# Patient Record
Sex: MALE | Race: WHITE
[De-identification: names, ages, dates, MRNs, and addresses within clinical notes are randomized per-mention and may not be internally consistent; named-entity substitution may affect disease eponyms.]

---

## 2018-07-21 ENCOUNTER — HOSPITAL ENCOUNTER (EMERGENCY)
Dept: HOSPITAL 92 - ERS | Age: 33
Discharge: HOME | End: 2018-07-21
Payer: COMMERCIAL

## 2018-07-21 DIAGNOSIS — K29.70: Primary | ICD-10-CM

## 2018-07-21 DIAGNOSIS — F17.210: ICD-10-CM

## 2018-07-21 LAB
ALBUMIN SERPL BCG-MCNC: 4.7 G/DL (ref 3.5–5)
ALP SERPL-CCNC: 65 U/L (ref 40–150)
ALT SERPL W P-5'-P-CCNC: 13 U/L (ref 8–55)
ANION GAP SERPL CALC-SCNC: 11 MMOL/L (ref 10–20)
AST SERPL-CCNC: 15 U/L (ref 5–34)
BASOPHILS # BLD AUTO: 0.1 THOU/UL (ref 0–0.2)
BASOPHILS NFR BLD AUTO: 1.1 % (ref 0–1)
BILIRUB SERPL-MCNC: 0.9 MG/DL (ref 0.2–1.2)
BUN SERPL-MCNC: 9 MG/DL (ref 8.9–20.6)
CALCIUM SERPL-MCNC: 9.6 MG/DL (ref 7.8–10.44)
CHLORIDE SERPL-SCNC: 104 MMOL/L (ref 98–107)
CK SERPL-CCNC: 104 U/L (ref 30–200)
CO2 SERPL-SCNC: 27 MMOL/L (ref 22–29)
CREAT CL PREDICTED SERPL C-G-VRATE: 0 ML/MIN (ref 70–130)
EOSINOPHIL # BLD AUTO: 0.1 THOU/UL (ref 0–0.7)
EOSINOPHIL NFR BLD AUTO: 1.7 % (ref 0–10)
GLOBULIN SER CALC-MCNC: 3 G/DL (ref 2.4–3.5)
GLUCOSE SERPL-MCNC: 134 MG/DL (ref 70–105)
GLUCOSE UR STRIP-MCNC: >=1000 MG/DL
HGB BLD-MCNC: 16.6 G/DL (ref 14–18)
LYMPHOCYTES # BLD: 2.8 THOU/UL (ref 1.2–3.4)
LYMPHOCYTES NFR BLD AUTO: 32.3 % (ref 21–51)
MCH RBC QN AUTO: 32.8 PG (ref 27–31)
MCV RBC AUTO: 96.7 FL (ref 78–98)
MONOCYTES # BLD AUTO: 0.7 THOU/UL (ref 0.11–0.59)
MONOCYTES NFR BLD AUTO: 7.8 % (ref 0–10)
NEUTROPHILS # BLD AUTO: 4.9 THOU/UL (ref 1.4–6.5)
NEUTROPHILS NFR BLD AUTO: 57.2 % (ref 42–75)
PLATELET # BLD AUTO: 318 THOU/UL (ref 130–400)
POTASSIUM SERPL-SCNC: 4 MMOL/L (ref 3.5–5.1)
RBC # BLD AUTO: 5.07 MILL/UL (ref 4.7–6.1)
SODIUM SERPL-SCNC: 138 MMOL/L (ref 136–145)
SP GR UR STRIP: 1.02 (ref 1–1.04)
TROPONIN I SERPL DL<=0.01 NG/ML-MCNC: (no result) NG/ML (ref ?–0.03)
WBC # BLD AUTO: 8.5 THOU/UL (ref 4.8–10.8)

## 2018-07-21 PROCEDURE — 93005 ELECTROCARDIOGRAM TRACING: CPT

## 2018-07-21 PROCEDURE — 80307 DRUG TEST PRSMV CHEM ANLYZR: CPT

## 2018-07-21 PROCEDURE — 82550 ASSAY OF CK (CPK): CPT

## 2018-07-21 PROCEDURE — 84484 ASSAY OF TROPONIN QUANT: CPT

## 2018-07-21 PROCEDURE — 83690 ASSAY OF LIPASE: CPT

## 2018-07-21 PROCEDURE — 85025 COMPLETE CBC W/AUTO DIFF WBC: CPT

## 2018-07-21 PROCEDURE — 81003 URINALYSIS AUTO W/O SCOPE: CPT

## 2018-07-21 PROCEDURE — 80053 COMPREHEN METABOLIC PANEL: CPT

## 2019-10-17 ENCOUNTER — HOSPITAL ENCOUNTER (EMERGENCY)
Dept: HOSPITAL 92 - ERS | Age: 34
Discharge: HOME | End: 2019-10-17
Payer: COMMERCIAL

## 2019-10-17 DIAGNOSIS — F17.210: ICD-10-CM

## 2019-10-17 DIAGNOSIS — M94.0: Primary | ICD-10-CM

## 2019-10-17 PROCEDURE — 93005 ELECTROCARDIOGRAM TRACING: CPT

## 2019-10-17 PROCEDURE — 71046 X-RAY EXAM CHEST 2 VIEWS: CPT

## 2019-10-17 NOTE — RAD
Exam: Chest 2 views



HISTORY:Emergency exam



Comparison: 1/10/2010



FINDINGS:



Lungs: No masses or consolidation.



Cardiac silhouette: Normal size

Pulmonary vessels: Normal

Pleural Spaces: Clear

Pneumothorax: None



Osseous abnormalities: None of acuity.



IMPRESSION: No focal consolidation.





Reported By: Tobi Garcia 

Electronically Signed:  10/17/2019 7:08 PM

## 2020-01-27 ENCOUNTER — HOSPITAL ENCOUNTER (INPATIENT)
Dept: HOSPITAL 92 - ERS | Age: 35
LOS: 6 days | Discharge: HOME | DRG: 234 | End: 2020-02-02
Attending: FAMILY MEDICINE | Admitting: FAMILY MEDICINE
Payer: COMMERCIAL

## 2020-01-27 VITALS — BODY MASS INDEX: 19.6 KG/M2

## 2020-01-27 DIAGNOSIS — E11.65: ICD-10-CM

## 2020-01-27 DIAGNOSIS — E87.1: ICD-10-CM

## 2020-01-27 DIAGNOSIS — I21.4: Primary | ICD-10-CM

## 2020-01-27 DIAGNOSIS — F12.10: ICD-10-CM

## 2020-01-27 DIAGNOSIS — F17.200: ICD-10-CM

## 2020-01-27 DIAGNOSIS — I25.110: ICD-10-CM

## 2020-01-27 DIAGNOSIS — Z82.49: ICD-10-CM

## 2020-01-27 DIAGNOSIS — Z71.6: ICD-10-CM

## 2020-01-27 LAB
ALBUMIN SERPL BCG-MCNC: 4.2 G/DL (ref 3.5–5)
ALP SERPL-CCNC: 80 U/L (ref 40–110)
ALT SERPL W P-5'-P-CCNC: 12 U/L (ref 8–55)
ANION GAP SERPL CALC-SCNC: 13 MMOL/L (ref 10–20)
AST SERPL-CCNC: 14 U/L (ref 5–34)
BASOPHILS # BLD AUTO: 0.1 THOU/UL (ref 0–0.2)
BASOPHILS NFR BLD AUTO: 0.7 % (ref 0–1)
BILIRUB SERPL-MCNC: 1.2 MG/DL (ref 0.2–1.2)
BUN SERPL-MCNC: 9 MG/DL (ref 8.9–20.6)
CALCIUM SERPL-MCNC: 8.8 MG/DL (ref 7.8–10.44)
CHLORIDE SERPL-SCNC: 103 MMOL/L (ref 98–107)
CK MB SERPL-MCNC: 1 NG/ML (ref 0–6.6)
CK SERPL-CCNC: 88 U/L (ref 30–200)
CO2 SERPL-SCNC: 26 MMOL/L (ref 22–29)
CREAT CL PREDICTED SERPL C-G-VRATE: 0 ML/MIN (ref 70–130)
DRUG SCREEN CUTOFF: (no result)
EOSINOPHIL # BLD AUTO: 0.1 THOU/UL (ref 0–0.7)
EOSINOPHIL NFR BLD AUTO: 0.5 % (ref 0–10)
GLOBULIN SER CALC-MCNC: 2.5 G/DL (ref 2.4–3.5)
GLUCOSE SERPL-MCNC: 239 MG/DL (ref 70–105)
GLUCOSE UR STRIP-MCNC: >=1000 MG/DL
HGB BLD-MCNC: 16 G/DL (ref 14–18)
LYMPHOCYTES # BLD: 1.8 THOU/UL (ref 1.2–3.4)
LYMPHOCYTES NFR BLD AUTO: 14.4 % (ref 21–51)
MCH RBC QN AUTO: 32 PG (ref 27–31)
MCV RBC AUTO: 95.5 FL (ref 78–98)
MEDTOX CONTROL LINE VALID?: (no result)
MEDTOX READER #: (no result)
MONOCYTES # BLD AUTO: 0.6 THOU/UL (ref 0.11–0.59)
MONOCYTES NFR BLD AUTO: 4.9 % (ref 0–10)
NEUTROPHILS # BLD AUTO: 10 THOU/UL (ref 1.4–6.5)
NEUTROPHILS NFR BLD AUTO: 79.6 % (ref 42–75)
PLATELET # BLD AUTO: 326 THOU/UL (ref 130–400)
POTASSIUM SERPL-SCNC: 4.1 MMOL/L (ref 3.5–5.1)
RBC # BLD AUTO: 5.01 MILL/UL (ref 4.7–6.1)
SODIUM SERPL-SCNC: 138 MMOL/L (ref 136–145)
TROPONIN I SERPL DL<=0.01 NG/ML-MCNC: 0.1 NG/ML (ref ?–0.03)
WBC # BLD AUTO: 12.6 THOU/UL (ref 4.8–10.8)

## 2020-01-27 PROCEDURE — 85025 COMPLETE CBC W/AUTO DIFF WBC: CPT

## 2020-01-27 PROCEDURE — 96372 THER/PROPH/DIAG INJ SC/IM: CPT

## 2020-01-27 PROCEDURE — 36416 COLLJ CAPILLARY BLOOD SPEC: CPT

## 2020-01-27 PROCEDURE — 90732 PPSV23 VACC 2 YRS+ SUBQ/IM: CPT

## 2020-01-27 PROCEDURE — 71045 X-RAY EXAM CHEST 1 VIEW: CPT

## 2020-01-27 PROCEDURE — S0028 INJECTION, FAMOTIDINE, 20 MG: HCPCS

## 2020-01-27 PROCEDURE — 84443 ASSAY THYROID STIM HORMONE: CPT

## 2020-01-27 PROCEDURE — 36430 TRANSFUSION BLD/BLD COMPNT: CPT

## 2020-01-27 PROCEDURE — 80053 COMPREHEN METABOLIC PANEL: CPT

## 2020-01-27 PROCEDURE — 83036 HEMOGLOBIN GLYCOSYLATED A1C: CPT

## 2020-01-27 PROCEDURE — 90471 IMMUNIZATION ADMIN: CPT

## 2020-01-27 PROCEDURE — 93458 L HRT ARTERY/VENTRICLE ANGIO: CPT

## 2020-01-27 PROCEDURE — 84484 ASSAY OF TROPONIN QUANT: CPT

## 2020-01-27 PROCEDURE — 82553 CREATINE MB FRACTION: CPT

## 2020-01-27 PROCEDURE — S0017 INJECTION, AMINOCAPROIC ACID: HCPCS

## 2020-01-27 PROCEDURE — P9045 ALBUMIN (HUMAN), 5%, 250 ML: HCPCS

## 2020-01-27 PROCEDURE — 82550 ASSAY OF CK (CPK): CPT

## 2020-01-27 PROCEDURE — 93306 TTE W/DOPPLER COMPLETE: CPT

## 2020-01-27 PROCEDURE — 81003 URINALYSIS AUTO W/O SCOPE: CPT

## 2020-01-27 PROCEDURE — 85610 PROTHROMBIN TIME: CPT

## 2020-01-27 PROCEDURE — 93798 PHYS/QHP OP CAR RHAB W/ECG: CPT

## 2020-01-27 PROCEDURE — 84132 ASSAY OF SERUM POTASSIUM: CPT

## 2020-01-27 PROCEDURE — 90686 IIV4 VACC NO PRSV 0.5 ML IM: CPT

## 2020-01-27 PROCEDURE — 86900 BLOOD TYPING SEROLOGIC ABO: CPT

## 2020-01-27 PROCEDURE — 93005 ELECTROCARDIOGRAM TRACING: CPT

## 2020-01-27 PROCEDURE — C1769 GUIDE WIRE: HCPCS

## 2020-01-27 PROCEDURE — 83880 ASSAY OF NATRIURETIC PEPTIDE: CPT

## 2020-01-27 PROCEDURE — 94760 N-INVAS EAR/PLS OXIMETRY 1: CPT

## 2020-01-27 PROCEDURE — G0008 ADMIN INFLUENZA VIRUS VAC: HCPCS

## 2020-01-27 PROCEDURE — 96360 HYDRATION IV INFUSION INIT: CPT

## 2020-01-27 PROCEDURE — 83735 ASSAY OF MAGNESIUM: CPT

## 2020-01-27 PROCEDURE — 93010 ELECTROCARDIOGRAM REPORT: CPT

## 2020-01-27 PROCEDURE — G0009 ADMIN PNEUMOCOCCAL VACCINE: HCPCS

## 2020-01-27 PROCEDURE — 80061 LIPID PANEL: CPT

## 2020-01-27 PROCEDURE — 80048 BASIC METABOLIC PNL TOTAL CA: CPT

## 2020-01-27 PROCEDURE — 80306 DRUG TEST PRSMV INSTRMNT: CPT

## 2020-01-27 PROCEDURE — 85730 THROMBOPLASTIN TIME PARTIAL: CPT

## 2020-01-27 PROCEDURE — 96361 HYDRATE IV INFUSION ADD-ON: CPT

## 2020-01-27 PROCEDURE — 36415 COLL VENOUS BLD VENIPUNCTURE: CPT

## 2020-01-27 PROCEDURE — 86901 BLOOD TYPING SEROLOGIC RH(D): CPT

## 2020-01-27 PROCEDURE — 86850 RBC ANTIBODY SCREEN: CPT

## 2020-01-27 NOTE — RAD
PORTABLE CHEST:

 

History: Chest pain. 

 

FINDINGS: 

Heart size and mediastinum are within normal limits. Lungs are clear of infiltrates. No significant b
art findings. 

 

IMPRESSION: 

No active intrathoracic disease. 

 

POS: [facility]

## 2020-01-28 LAB
ANION GAP SERPL CALC-SCNC: 9 MMOL/L (ref 10–20)
BASOPHILS # BLD AUTO: 0.1 THOU/UL (ref 0–0.2)
BASOPHILS NFR BLD AUTO: 0.8 % (ref 0–1)
BUN SERPL-MCNC: 11 MG/DL (ref 8.9–20.6)
CALCIUM SERPL-MCNC: 8.5 MG/DL (ref 7.8–10.44)
CHD RISK SERPL-RTO: 3.7 (ref ?–4.5)
CHLORIDE SERPL-SCNC: 106 MMOL/L (ref 98–107)
CHOLEST SERPL-MCNC: 172 MG/DL
CK MB SERPL-MCNC: 1 NG/ML (ref 0–6.6)
CO2 SERPL-SCNC: 24 MMOL/L (ref 22–29)
CREAT CL PREDICTED SERPL C-G-VRATE: 107 ML/MIN (ref 70–130)
EOSINOPHIL # BLD AUTO: 0.3 THOU/UL (ref 0–0.7)
EOSINOPHIL NFR BLD AUTO: 2.9 % (ref 0–10)
GLUCOSE SERPL-MCNC: 190 MG/DL (ref 70–105)
HDLC SERPL-MCNC: 47 MG/DL
HGB BLD-MCNC: 15.5 G/DL (ref 14–18)
LDLC SERPL CALC-MCNC: 98 MG/DL
LYMPHOCYTES # BLD: 4.3 THOU/UL (ref 1.2–3.4)
LYMPHOCYTES NFR BLD AUTO: 48.2 % (ref 21–51)
MCH RBC QN AUTO: 32.1 PG (ref 27–31)
MCV RBC AUTO: 95.9 FL (ref 78–98)
MONOCYTES # BLD AUTO: 0.6 THOU/UL (ref 0.11–0.59)
MONOCYTES NFR BLD AUTO: 6.4 % (ref 0–10)
NEUTROPHILS # BLD AUTO: 3.7 THOU/UL (ref 1.4–6.5)
NEUTROPHILS NFR BLD AUTO: 41.7 % (ref 42–75)
PLATELET # BLD AUTO: 301 THOU/UL (ref 130–400)
POTASSIUM SERPL-SCNC: 4.1 MMOL/L (ref 3.5–5.1)
RBC # BLD AUTO: 4.83 MILL/UL (ref 4.7–6.1)
SODIUM SERPL-SCNC: 135 MMOL/L (ref 136–145)
TRIGL SERPL-MCNC: 134 MG/DL (ref ?–150)
TROPONIN I SERPL DL<=0.01 NG/ML-MCNC: 0.11 NG/ML (ref ?–0.03)
WBC # BLD AUTO: 9 THOU/UL (ref 4.8–10.8)

## 2020-01-28 PROCEDURE — B2151ZZ FLUOROSCOPY OF LEFT HEART USING LOW OSMOLAR CONTRAST: ICD-10-PCS | Performed by: INTERNAL MEDICINE

## 2020-01-28 PROCEDURE — 4A023N7 MEASUREMENT OF CARDIAC SAMPLING AND PRESSURE, LEFT HEART, PERCUTANEOUS APPROACH: ICD-10-PCS | Performed by: INTERNAL MEDICINE

## 2020-01-28 PROCEDURE — B2111ZZ FLUOROSCOPY OF MULTIPLE CORONARY ARTERIES USING LOW OSMOLAR CONTRAST: ICD-10-PCS | Performed by: INTERNAL MEDICINE

## 2020-01-28 RX ADMIN — Medication SCH: at 21:37

## 2020-01-28 RX ADMIN — Medication SCH: at 08:50

## 2020-01-28 NOTE — CON
DATE OF CONSULTATION:  



HISTORY OF PRESENT ILLNESS:  A 34-year-old male with past medical history of

diabetes and significant tobacco abuse, presented to the emergency department

yesterday for chest pain.  The patient reports multiple months of ongoing chest pain

that occurs nearly daily.  It is brought on by exertion and relieved by rest.  The

patient notes that pain was increasing in duration.  Yesterday, he begin to have

chest pain whenever he woke up and this pain lasted from 7:00 a.m. until later in

the afternoon when he was given nitroglycerin in the emergency department.  The

nitroglycerin relieved his pain.  Pain is described as burning, radiates up his

neck.  He also notes bilateral forearm pain.  He has no primary care physician.

History of diabetes diagnosed as a teenager and has not been on any medications.  He

continues to smoke. 



PAST MEDICAL HISTORY:  Uncontrolled diabetes, not on medications.



PAST SURGICAL HISTORY:  None.



SOCIAL HISTORY:  Positive for marijuana on drug screen.  No alcohol use.  Current

smoker, smoking 1-1/2 to 2-1/2 packs per day for the past 18 years. 



FAMILY HISTORY:  Positive for coronary artery disease in multiple family members.



ALLERGIES:  NONE.



REVIEW OF SYSTEMS:  Complete review of systems reviewed and negative apart from

dizziness. 



PHYSICAL EXAMINATION:

VITAL SIGNS:  Temperature 97.6, pulse 74, respirations 14, O2 saturation 98% on room

air, blood pressure 115/75. 

HEENT:  Normocephalic, atraumatic. 

NECK:  Carotids are 2+.  I could not hear any bruits. 

CHEST:  Clear to auscultation.  No rales, rhonchi, or wheezing. 

CARDIOVASCULAR:  Regular rate and rhythm with normal S1 and S2.  No significant

murmurs. 

ABDOMEN:  Soft, nontender.  Bowel sounds present. 

EXTREMITIES:  Normal muscle strength and tone.  No cyanosis or clubbing.  Pulses are

2+ and equal. 



LABORATORY DATA:  Troponin max 0.107 and downtrended to most recent of 0.094.

Hemoglobin A1c 10.0. 



IMPRESSION:  Unstable angina.  Consider inpatient significant risk factors including

uncontrolled diabetes and tobacco abuse.  Discussed options for evaluation with the

patient considering abnormal troponin.  We will proceed with cardiac

catheterization.  Risks, benefits, and alternatives discussed and all questions

answered.  The patient to remain n.p.o. and we will plan for catheterization today. 







Job ID:  [id number]

## 2020-01-28 NOTE — PDOC.HOSPP
- Subjective


Encounter Date: 01/28/20


Encounter Time: 13:38


Subjective: 





post cath, no pain, cabg planned





- Objective


Vital Signs & Weight: 


 Vital Signs (12 hours)











  Temp Pulse Resp BP Pulse Ox


 


 01/28/20 07:21  97.6 F  74  14  115/75  98


 


 01/28/20 04:54  98.1 F  70  16  116/72  100








 Weight











Weight                         110 lb 12.8 oz














Result Diagrams: 


 01/28/20 04:19





 01/28/20 04:19


Additional Labs: 


 Accuchecks











  01/28/20 01/28/20





  13:23 06:23


 


POC Glucose  137 H  165 H














Hospitalist ROS





- Medication


Medications: 


Active Medications











Generic Name Dose Route Start Last Admin





  Trade Name Freq  PRN Reason Stop Dose Admin


 


Acetaminophen  650 mg  01/28/20 01:08  01/28/20 09:54





  Tylenol  PO   650 mg





  Q4H PRN   Administration





  Headache/Fever/Mild Pain (1-3)   





     





     





     


 


Aspirin  81 mg  01/28/20 09:00  01/28/20 08:51





  Ecotrin  PO   81 mg





  DAILY SHANITA   Administration





     





     





     





     


 


Sodium Chloride  1,000 mls @ 65 mls/hr  01/28/20 01:15  01/28/20 01:37





  Normal Saline 0.9%  IV   1,000 mls





  .[id number] SHANITA   Administration





     





     





     





     


 


Sodium Chloride  10 ml  01/28/20 09:00  01/28/20 08:50





  Flush - Normal Saline  IVF   Not Given





  Q12HR SHANITA   





     





     





     





     














- Exam


General Appearance: awake alert


Neck: no JVD


Heart: RRR, no murmur


Respiratory: CTAB


Gastrointestinal: soft, normal bowel sounds


Extremities: no edema





Hosp A/P


(1) Non-ST elevation MI (NSTEMI)


Code(s): I21.4 - NON-ST ELEVATION (NSTEMI) MYOCARDIAL INFARCTION   Status: 

Acute   





(2) CAD (coronary artery disease)


Code(s): I25.10 - ATHSCL HEART DISEASE OF NATIVE CORONARY ARTERY W/O ANG PCTRS 

  Status: Acute   


Qualifiers: 


   Coronary Disease-Associated Artery/Lesion type: native artery   Native vs. 

transplanted heart: native heart   Associated angina: with unstable angina   

Qualified Code(s): I25.110 - Atherosclerotic heart disease of native coronary 

artery with unstable angina pectoris   





(3) DM (diabetes mellitus), type 2, uncontrolled


Code(s): E11.65 - TYPE 2 DIABETES MELLITUS WITH HYPERGLYCEMIA   Status: Chronic

   


Qualifiers: 


   Glycemic state: with hyperglycemia   Qualified Code(s): E11.65 - Type 2 

diabetes mellitus with hyperglycemia   





(4) Tobacco abuse


Code(s): Z72.0 - TOBACCO USE   Status: Chronic   





- Plan





CABG 1/29/20


cont ASA etc

## 2020-01-28 NOTE — CON
DATE OF CONSULTATION:  



ADDENDUM:  

Please refer to the notes dictated by the Family Practice Resident, April Goodson.

Mr. Vidal is a very pleasant 34-year-old gentleman with a history of diabetes and

tobacco abuse.  He has not been taking his medications.  He has been diagnosed with

diabetes for approximately 18 years or longer, but he has not been taking his

medications.  He had been on medications in the past, but stopped taking, stating he

cannot afford the medications.  He has also not been taking his other medications.

He does not have any significant history of hypertension.  He does smoke marijuana;

however, but his risk factors for coronary artery disease would include diabetes as

well as most likely hypercholesterolemia and tobacco abuse.  He did smoke heavily in

the past, continues to smoke up to one and half to two packs a day, smoked for the

last 18 years.  His family history was positive for coronary artery disease.  His

allergies were none.  His medications, he is not taking medications that have been

prescribed.  He has no primary care physician.  He does not follow up with the

doctors.  He presented to the emergency room complaining of chest pain that started

while he was at work.  Enzymes were slightly abnormal and would still be considered

to be indeterminate for myocardial infarction; however, this appears to be a non-ST

segment elevation myocardial infarction.  He did have EKG changes with T-wave

inversions in the anterior leads, but did not have any Q-waves.  He did have also

T-wave inversions in III and aVF.  He will be advised to goes to the cardiac cath

lab for further evaluation due to the strong family history and also continued EKG

changes as well as abnormal enzymes and history of tobacco abuse and diabetes.  I

have seen the patient, evaluated the patient, and agree with the assessment and

plan, it was dictated by the resident. 







Job ID:  [id number]

## 2020-01-28 NOTE — HP
CHIEF COMPLAINT:  Persistent and increasing chest pain.



HISTORY OF PRESENT ILLNESS:  Mr. Vidal is a 34-year-old gentleman with a known

history of diabetes mellitus, controlled with diet, who presents with ongoing chest

pain for the last month.  The patient states the pain occurs almost daily, initially

lasting a couple of minutes and brought on when he exerted himself and relieved with

rest.  The pain has been progressively worse in severity and lasting a few minutes

longer than usual over the last week.  The patient states the pain is in the center

of his chest radiating up into his neck to his jaw and reports pain in the bilateral

forearms.  He states the pain in his forearm comes on at the same time as the chest

pain and just like the chest pain, it resolves once he is resting.  He does not know

if he was taking any medications for this at home.  Denies any associated shortness

of breath, cough, or hemoptysis.  No diaphoresis.  No nausea or vomiting.  No

abdominal pain.  The patient concerned due to a strong family history of coronary

artery disease and diabetes mellitus in both parents as well as in maternal uncles. 



In the emergency department, the patient underwent laboratory studies notable for a

white count of 12.6, hemoglobin 16, platelets 326, neutrophils 79.6.  Sodium is

__________, potassium 4.1, BUN 9, creatinine 0.81, GFR 90, glucose 239, calcium 8.8.

 LFTs unremarkable.  Initial troponin was 0.092 and second troponin 0.105.

Urinalysis notable for greater than 1000 glucose, 740 ketones.  Urine drug screen

has come back positive for cannabinoids.  He has had a chest x-ray done showing no

acute intrathoracic disease. 



In the ED, he has been treated with 324 mg of aspirin and given 1 L of normal

saline.  He was also started on Lovenox 1 mg/kg.  An EKG done in the ER showed

normal sinus rhythm with a heart rate of 62.  He did have some changes concerning

for LVH. 



PAST MEDICAL HISTORY:  

1. Diabetes, diet controlled.

2. Heavy smoker.



PAST SURGICAL HISTORY:  None.



SOCIAL HISTORY:  The patient reports smoking marijuana occasionally.  Denies any

heavy alcohol consumption.  Reports smoking 1-1/2 to 2-1/2 packs of cigarettes per

day for the last 20 years. 



ALLERGIES:  NO KNOWN DRUG ALLERGIES.



CURRENT MEDICATIONS:  None.



PHYSICAL EXAMINATION:

GENERAL:  The patient appears thin, well developed, and in no acute distress. 

VITAL SIGNS:  Temperature 98, pulse 68, blood pressure 154/82, respirations 18, and

O2 saturation 99% on room air. 

HEENT:  Normocephalic and atraumatic.  Pupils are equal, round, and reactive to

light.  Sclerae without icterus.  Oropharynx is clear. 

NECK:  Supple. 

LUNGS:  Clear to auscultation bilaterally without wheezes, rales, or rhonchi. 

CARDIAC:  Regular rate and rhythm.  No chest wall tenderness. 

NEUROLOGIC:  Alert and oriented x3. 

SKIN:  Warm and dry. 

EXTREMITIES:  No lower leg swelling or edema. 

NEUROLOGIC:  Alert and oriented x3.  No neuro deficits on exam.



INVESTIGATIONS:  As mentioned above in HPI.



IMPRESSION AND PLAN:  Mr. Vidal is a 34-year-old gentleman with persistent chest pain

for the last month, becoming more frequent and lasting longer and more prolonged

with exertion, who is being admitted for management of the followin. Acute coronary syndrome rule out.  We will continue to trend troponins.  The

patient __________ the ED.  We will consult Cardiology given concern for unstable

angina and strong family history.  Troponin slightly bumped from initial troponin.

Awaiting third troponin and we will plan to have another troponin.  The patient has

never had an echocardiogram done in the past.  We will add a BNP, magnesium, TSH and

morning lipid panel.  We will also obtain a baseline echo.  Continue cardiac

monitoring. 

2. Diabetes.  The patient is not on any medications.  We will obtain hemoglobin A1c

as it appears his glucose is not being well managed with dietary changes.  We will

initiate insulin sliding scale.  Monitor blood glucose. 

3. Heavy tobacco use.  Smoking cessation.  We will hold on nicotine patch at this

present time. 

4. Gastrointestinal prophylaxis with famotidine.

5. Deep venous thrombosis prophylaxis.  The patient is ambulatory.  Lovenox started

in the ED.  We will continue. 

6. Code status, full.  Surrogate decision maker is his mother, Emily Vidal. 

The patient's case was discussed with the attending, who agrees with plan of care as

described above. 







Job ID:  [id number]

## 2020-01-29 LAB
ANION GAP SERPL CALC-SCNC: 6 MMOL/L (ref 10–20)
APTT PPP: 29.5 SEC (ref 22.9–36.1)
BASOPHILS # BLD AUTO: 0 THOU/UL (ref 0–0.2)
BASOPHILS NFR BLD AUTO: 0.1 % (ref 0–1)
BUN SERPL-MCNC: 7 MG/DL (ref 8.9–20.6)
CALCIUM SERPL-MCNC: 7.5 MG/DL (ref 7.8–10.44)
CHLORIDE SERPL-SCNC: 112 MMOL/L (ref 98–107)
CO2 SERPL-SCNC: 26 MMOL/L (ref 22–29)
CREAT CL PREDICTED SERPL C-G-VRATE: 127 ML/MIN (ref 70–130)
EOSINOPHIL # BLD AUTO: 0.1 THOU/UL (ref 0–0.7)
EOSINOPHIL NFR BLD AUTO: 0.4 % (ref 0–10)
GLUCOSE SERPL-MCNC: 162 MG/DL (ref 70–105)
HGB BLD-MCNC: 13.8 G/DL (ref 14–18)
INR PPP: 1.4
LYMPHOCYTES # BLD: 0.9 THOU/UL (ref 1.2–3.4)
LYMPHOCYTES NFR BLD AUTO: 4.4 % (ref 21–51)
MCH RBC QN AUTO: 32.1 PG (ref 27–31)
MCV RBC AUTO: 96.5 FL (ref 78–98)
MONOCYTES # BLD AUTO: 0.8 THOU/UL (ref 0.11–0.59)
MONOCYTES NFR BLD AUTO: 3.8 % (ref 0–10)
NEUTROPHILS # BLD AUTO: 17.9 THOU/UL (ref 1.4–6.5)
NEUTROPHILS NFR BLD AUTO: 91.3 % (ref 42–75)
PLATELET # BLD AUTO: 163 THOU/UL (ref 130–400)
POTASSIUM SERPL-SCNC: 3.7 MMOL/L (ref 3.5–5.1)
PROTHROMBIN TIME: 16.8 SEC (ref 12–14.7)
RBC # BLD AUTO: 4.29 MILL/UL (ref 4.7–6.1)
SODIUM SERPL-SCNC: 140 MMOL/L (ref 136–145)
WBC # BLD AUTO: 19.6 THOU/UL (ref 4.8–10.8)

## 2020-01-29 PROCEDURE — 021209W BYPASS CORONARY ARTERY, THREE ARTERIES FROM AORTA WITH AUTOLOGOUS VENOUS TISSUE, OPEN APPROACH: ICD-10-PCS | Performed by: THORACIC SURGERY (CARDIOTHORACIC VASCULAR SURGERY)

## 2020-01-29 PROCEDURE — 5A1221Z PERFORMANCE OF CARDIAC OUTPUT, CONTINUOUS: ICD-10-PCS | Performed by: THORACIC SURGERY (CARDIOTHORACIC VASCULAR SURGERY)

## 2020-01-29 PROCEDURE — 02100Z9 BYPASS CORONARY ARTERY, ONE ARTERY FROM LEFT INTERNAL MAMMARY, OPEN APPROACH: ICD-10-PCS | Performed by: THORACIC SURGERY (CARDIOTHORACIC VASCULAR SURGERY)

## 2020-01-29 PROCEDURE — 06BQ3ZZ EXCISION OF LEFT SAPHENOUS VEIN, PERCUTANEOUS APPROACH: ICD-10-PCS | Performed by: THORACIC SURGERY (CARDIOTHORACIC VASCULAR SURGERY)

## 2020-01-29 RX ADMIN — FAMOTIDINE SCH MG: 10 INJECTION, SOLUTION INTRAVENOUS at 20:21

## 2020-01-29 RX ADMIN — HYDROCODONE BITARTRATE AND ACETAMINOPHEN PRN TAB: 5; 325 TABLET ORAL at 23:58

## 2020-01-29 RX ADMIN — HYDROCODONE BITARTRATE AND ACETAMINOPHEN PRN TAB: 5; 325 TABLET ORAL at 18:26

## 2020-01-29 NOTE — CON
DATE OF CONSULTATION:  2020



REQUESTING PHYSICIAN:  Dr. Tee.



CHIEF COMPLAINT:  Chest pain.



HISTORY OF PRESENT ILLNESS:  The patient is a 34-year-old poorly-controlled diabetic

smoker with a positive family history for premature coronary disease.  He had his

fist episode of chest pain about a year ago.  He says that around 2019, he

had an episode that awoke him in the morning as associated with severe pain and

pounding of his heart.  He said it felt like his heart was going to explode.  He had

ache in both forearms, but it spontaneously resolved.  Since then, he has had

discomfort almost on a daily basis, but has typically been short-lived, usually it

is precipitated by exertion (he works sacking groceries and retrieving grocery carts

at a grocery store), but yesterday morning at about 7 o'clock, he had an episode of

this same sort of chest pain.  It was not as severe as his first episode, but was

associated with nausea which is not typical for him and even more significant, it

lasted almost all day.  He finally presented to the emergency room late in the day

and had a positive troponin at that time.  His EKG showed precordial ST depression

and T-wave inversions, but no ST elevations.  Cardiac catheterization today shows

severe 3-vessel coronary artery disease with low normal LVEF. 



PAST MEDICAL HISTORY:  Significant for diabetes, it was diagnosed at age 16.  He

says that he has never been on medications and describes himself as being diet

controlled.  He has smoked for about 20 years, typically around a pack or a pack and

half a day, but at times as much as 2-1/2 packs a day.  He occasionally smokes

marijuana. 



FAMILY HISTORY:  Quite striking for coronary artery disease.  His mother described

as his family being "riddled" with heart disease.  Her father had coronary artery

bypass surgery few years ago, but only recently  having lived up into his 90s,

but her mother had diabetes and coronary artery disease. She has brothers who have

had coronary artery disease and she has had nephews, the patient's cousins who have

had coronary artery disease in their 20s and 30s. He says that her father's first

heart attack was in his late teens. 



REVIEW OF SYSTEMS:  He is beginning to have some difficulty with visual acuity, but

he has had no transient eye, speech, facial, or extremity symptoms suggestive of

TIAs.  He has never had a known stroke.  He does not have any crampy pain in his

calves, hips, or buttocks consistent with claudication.  He typically does not have

any problems with shortness of breath or orthopnea.  He has not had any PND.  He has

not had any edema. 



PHYSICAL EXAMINATION:

GENERAL:  He is 5 feet 3 inches and weighs 110 and 3/4 pounds. 

VITAL SIGNS:  In the emergency room at presentation, as his pain was almost

completely resolved, his heart rate was 74, blood pressure of 120/70.  His most

recent measurements were heart rate of 74, blood pressure of 115/75, his temperature

is 97.6, room air O2 sats have been 98% to 100%. 

HEENT:  He has no xanthelasma. 

NECK:  No JVD.  No carotid bruits. 

CHEST:  Clear to auscultation.  He has regular rate and rhythm. 

ABDOMEN:  Soft and nontender without masses or bruits. 

EXTREMITIES:  He has a pressure device on his right wrist at his cath site, his left

radial pulse is full and easy to feel.  His Alvin's testing showed capillary refill

on his hands with ulnar flow only, but it seemed a little bit more sluggish than I

anticipated, which prompted a Doppler exam.  Doppler exam with his radial artery

compressed, he had a very strong radial pulse and digital pulses in each of the 5

distal phalanges could be identified with the radial artery compressed.  He has

palpable femoral pulses with no associated bruits.  He has palpable dorsalis pedis

and posterior tibial pulses.  No clubbing, cyanosis, or edema.  No obvious

varicosities in his legs. 

NEUROLOGICAL: Grossly nonfocal.



LABORATORY DATA:  His white count was 12.6, hemoglobin 16.0, hematocrit 47.8,

platelets 326,000.  Hemoglobin A1c was 10.0.  Electrolytes were normal.  Glucose

239, BUN 9, creatinine 0.81, calcium 8.8, protein 6.7, albumin 4.2, bilirubin 1.2,

alkaline phosphatase 80, AST 14, ALT 12.  Fasting lipid showed a triglyceride 134,

cholesterol 172, LDL 98, and HDL 47.  His TSH was 0.6646.  His initial troponin

about 6:30 yesterday evening was 0.092, roughly 3 hours later was 0.105; after

midnight, it was 0.107; at about 4 o'clock this morning, it had come back down to

0.094.  His BNP at roughly midnight was 58.4.  His chest x-ray was clear.  His EKG

showed inverted T-waves in V2 and V3 and an essentially biphasic T-wave in V4.  He

had downsloping Ts in lead III, V1, 2 and 3 and an overtly depressed ST-segment in

V4.  His cardiac catheterization shows a right-dominant system.  His left main is

essentially normal.  He has about an 80% or 90% mid LAD lesion just proximal to a

stenotic, but very small bifurcated first diagonal.  The LAD wraps around the apex

and appears to be a good-sized vessel.  He has about a 99% discrete lesion in

reasonably good sized ramus and a long 70% or 80% lesion in the circumflex proper

just beyond an atrial branch.  There is competitive flow from the right system that

appears to probably represent the PDA.  He has proximal and distal 80% or 90%

lesions in the right coronary proper.  LVEF is around 40% or 50% with anterior

hypokinesis.  LV pressure is 116/9 with an EDP of 14.  Aortic pressure on pullback

was 115/68 with a mean of 90. 



IMPRESSION AND RECOMMENDATIONS:  Three-vessel coronary disease following a

non-ST-elevation anterior myocardial infarction in a poorly controlled  34-year-old

diabetic smoker with a positive family history of premature coronary artery disease.

 He probably has adequate flow to his hand to allow for a safe radial artery

harvest.  Whether his radial artery will be big enough to use given his small

stature is a different issue, but I think it is worth exploring.  I am reluctant to

do bilateral mammaries in this poorly-controlled diabetic and will prioritize

arterial conduits and use venous conduit for the remainder. 







Job ID:  [id number]

## 2020-01-29 NOTE — RAD
EXAM: Single view of the chest



HISTORY:   Status post open heart surgery



COMPARISON: 1/27/2020



FINDINGS: Single view of the chest shows a normal sized cardiomediastinal silhouette.  The patient is
 status post sternotomy. There is a left subclavian central venous catheter with its tip in

superior vena cava. Mediastinal drains are seen. No pneumothorax is present.  There is no evidence of
 consolidation, mass, or pleural effusion. The bones are unremarkable.



IMPRESSION: Appropriate position of lines and tubes status post sternotomy.



Reported By: Hitesh Palmer 

Electronically Signed:  1/29/2020 1:29 PM

## 2020-01-29 NOTE — OP
DATE OF PROCEDURE:  01/29/2020



PROCEDURES PERFORMED:  Coronary artery bypass grafting x4 with left internal mammary

artery to the distal left anterior descending, reverse greater saphenous vein graft

from the aorta to the PDA, and sequential left radial artery from the aorta (francis of

the PDA graft proximal anastomosis) to the ramus intermedius to the obtuse marginal. 



PREOPERATIVE DIAGNOSIS:  Coronary artery disease status post non-ST-elevation

myocardial infarction. 



POSTOPERATIVE DIAGNOSIS:  Coronary artery disease status post non-ST-elevation

myocardial infarction. 



ASSISTANT:  Elieser Bains MD



ANESTHESIA:  General endotracheal anesthesia.



INDICATIONS:  The patient is a 34-year-old type 1 diabetic, smoker, with a strongly

positive family history of premature coronary artery disease.  He has had

progression of angina for about a year and presented with a prolonged episode of

chest pain and ruled in for myocardial infarction by enzymes.  Cardiac

catheterization demonstrated severe 3-vessel coronary artery disease. 



FINDINGS:  Slightly small, but good quality BELLO and radial artery.  Good quality

saphenous vein.  The LAD was 1.5 to 2 mm.  The first diagonal was about 1 mm and not

grafted.  The ramus was a 2 mm intramyocardial vessel.  The OM was about 2 mm.  The

PDA was 1.5 to 2 mm.  The pericardium was closed.  Pump time was 98 minutes and

cross-clamp time was 50 minutes. 



NARRATIVE REPORT:  After informed consent was obtained, the patient was taken to the

operating room and placed in supine position on the operating table.  After the

induction of general anesthesia, a vascular exam of the left (nondominant) hand was

undertaken with a pulse oximetry probe on the patient's left index finger.  The left

radial artery was compressed.  There was no change in the pulse ox waveform.  With

compression of the ulnar artery, the waveform disappeared, and with release of it,

the waveform normalized.  His greater saphenous vein in his left thigh was

ultrasonographically mapped and marked.  He was placed in Trendelenburg and his left

upper chest was prepped and draped in sterile fashion.  A triple lumen central line

kit was used to place a left subclavian central line by the Seldinger technique.

All 3 ports easily aspirated and flushed.  The patient's torso, groins, left upper

extremity, and bilateral lower extremities were prepped and draped in sterile

fashion.  An incision was made sharply over the palpable radial pulse of the left

wrist.  It was exposed there.  It was somewhat spastic, exaggerating its small size,

that was enlarged in keeping with the patient's slight stature.  It was then exposed

with sequential extensions of the skin incision towards the antecubital fossa.

Tenotomy scissors were used to dissect the vessel, essentially skeletonizing it,

although some of the venae communicantes were left adherent.  The proximal end of

the radial artery was doubly ligated just at its origin from the brachial artery and

there was brisk backbleeding from the vessel.  It was doubly ligated at the wrist

and distally cannulated with a small bore olive-tipped needle to allow for

distention of the vessel with papaverine solution.  The radial artery was assessed

for adequacy of control of side branches and bathed in papaverine and then placed in

a container with  irrigant.  The forearm wound was irrigated and inspected for

hemostasis and then closed in layers of subcutaneous and subcuticular Vicryl.

Dermabond was applied and the wound was dressed and wrapped and the arm tucked.

Assistant exposed the greater saphenous vein just above the left knee and then

endoscopically harvested it from the knee to the groin.  The vein was prepared for

use as a graft and the port site was closed in layers of subcutaneous and

subcuticular Vicryl.  Meanwhile, a median sternotomy was performed.  The left

internal mammary artery was harvested as a skeletonized in-situ graft from the level

of the xiphoid to just beyond the level of the subclavian vein through an

extrapleural exposure.  A small dependent rent in the pleura near the apex was

repaired with Prolene suture.  The patient was heparinized.  The mammary was ligated

and divided distally.  There was excellent flow through the mammary.  Papaverine

solution was instilled intraluminally and the mammary distended nicely with that

maneuver.  The mammary bed was inspected for hemostasis.  The BELLO retractor was

placed with a Ewing retractor.  The pericardium was opened and marsupialized.  The

aorta was palpated.  It was soft and was fairly small caliber.  A double concentric

pursestring of 2-0 Ethibond was placed in the ascending aorta within the pericardial

reflection and a single pursestring was placed in the right atrial appendage.

Aortic and venous cannulas were inserted and secured by their pursestrings.

Cardiopulmonary bypass was instituted and the patient was systemically cooled.  The

heart was examined and the vessels to be bypassed were identified.  A longitudinal

slit was made in the pericardium anterior to the left phrenic nerve, through which

the mammary could be passed.  The plane between the aorta and the pulmonary artery

was developed.  An aortic cross-clamp was applied and cardioplegia was administered

through an aortic root needle.  When arrest have been achieved, attention was turned

to the PDA.  It was exposed and opened in its midportion, where it appeared on the

epicardial surface inferiorly.  The greater saphenous vein was reversed and

anastomosed there end-to-side with running 6-0 Prolene suture and the anastomosis

was tested by flushing cold cardioplegia down the graft.  Attention was then turned

to the anterolateral aspect of the heart.  The circumflex plaque was identified.

The obtuse marginal was coming out onto the epicardial surface of the heart, it was

somewhat small, but prior to that bifurcation point, the OM was about a 2 mm vessel.

 It was opened there and the radial artery was anastomosed there end-to-side with

running 7-0 Prolene suture with the anastomosis oriented perpendicular to the axis

of the coronary.  It was also tested with cold cardioplegia.  The ramus was then

exposed.  Upon dissecting out, the vessel could be appreciated.  There was hard

plaque in its midportion as it dove intramyocardially.  It was opened just distal to

that and a corresponding longitudinal arteriotomy was made in the radial artery,

positioning it, so that a gentle loop would be achieved, so that a parallel

anastomosis to the ramus could be constructed.  This side-to-side anastomosis was

constructed with 7-0 Prolene and tested with cardioplegia.  The LAD was then opened

and the mammary was anastomosed to it with running 7-0 Prolene.  The aortic

cross-clamp was replaced with a partial occluding clamp.  An aortotomy was made in

the ascending aorta with a scalpel and punch, incorporating the root needle site.

The vein graft to the PDA was brought along the right side of the heart and

anastomosed there end-to-side with a running Prolene suture.  A generous

longitudinal venotomy was made in the francis of that graft and the radial artery,

which had very brisk backflow bleeding.  It was trimmed to length and spatulated and

anastomosed there end-to-side.  The radial artery was allowed to backbleed.  The

partial occluding clamp was removed and the vein graft was de-aired.  The bulldogs

were removed from those 2 grafts.  The anastomoses were inspected for hemostasis.  A

posterior pericardial drain was brought out through a separate incision and secured

with suture.  Right atrial and right ventricular temporary epicardial pacing wires

were placed.  The patient was then easily  from cardiopulmonary bypass.

Aortic and venous cannulas were removed and their pursestrings were secured.

Protamine was administered.  When hemostasis was adequate, an anterior mediastinal

drain was placed.  The pericardium was easily closed over it with running Vicryl.

The cut surfaces of the sternum were treated with vancomycin paste and platelet-rich

GPS.  The sternum was reapproximated with #7 stainless steel wires.  The fascia was

closed over the wires with heavy Vicryl.  Platelet-poor GPS was then applied to the

soft tissues and the subcu was reapproximated with 2-0 Vicryl.  The skin was closed

with a running Vicryl subcuticular stitch.  The wounds were dressed.  The patient

was awakened and extubated in the operating room and taken to the intensive care

unit in stable condition. 







Job ID:  [id number]

## 2020-01-29 NOTE — PDOC.HOSPP
- Subjective


Encounter Date: 01/29/20


Encounter Time: 15:34


Subjective: 





awake, extubated, shoulders hurt





- Objective


Vital Signs & Weight: 


 Vital Signs (12 hours)











  Temp Pulse Resp BP Pulse Ox


 


 01/29/20 15:29      99


 


 01/29/20 15:00      100


 


 01/29/20 14:00  97.0 F L    


 


 01/29/20 06:00      96


 


 01/29/20 05:04  98.8 F  82  16  131/74  96








 Weight











Weight                         114 lb 3.2 oz











 Most Recent Monitor Data











Heart Rate from ECG            88


 


NIBP                           117/59


 


NIBP BP-Mean                   78


 


Respiration from ECG           19


 


SpO2                           100














I&O: 


 











 01/28/20 01/29/20 01/30/20





 06:59 06:59 06:59


 


Intake Total  4687 250


 


Output Total  2850 125


 


Balance  1837 125











Result Diagrams: 


 01/29/20 13:10





 01/29/20 13:10


Additional Labs: 


 Accuchecks











  01/29/20 01/29/20 01/29/20





  12:45 12:10 11:52


 


POC Glucose  172 H  220 H  194 H














  01/29/20 01/29/20 01/29/20





  11:31 11:24 11:05


 


POC Glucose  229 H  194 H  98














  01/29/20 01/29/20 01/29/20





  10:48 10:35 10:17


 


POC Glucose  139 H  136 H  141 H














  01/29/20 01/29/20 01/29/20





  10:09 08:00 05:34


 


POC Glucose  55 L*  192 H  205 H














  01/28/20 01/28/20





  20:24 16:51


 


POC Glucose  106  288 H














Hospitalist ROS





- Medication


Medications: 


Active Medications











Generic Name Dose Route Start Last Admin





  Trade Name Freq  PRN Reason Stop Dose Admin


 


Albumin Human  12.5 gm  01/29/20 12:32  01/29/20 13:45





  Albumin 5%  IVPB  01/30/20 12:33  12.5 gm





  Q6H PRN   Administration





  To Maintain SBP> 90 mmHG   





     





     





     


 


Fentanyl  50 mcg  01/29/20 12:32  01/29/20 15:32





  Sublimaze  SLOW IVP  01/31/20 07:18  50 mcg





  Q2H PRN   Administration





  Severe Pain (7-10)   





     





     





     


 


Sodium Chloride  1,000 mls @ 75 mls/hr  01/29/20 12:32  01/29/20 13:50





  Normal Saline 0.9%  IV   1,000 mls





  .[id number] SHANITA   Administration





     





     





     





     


 


Ketorolac Tromethamine  30 mg  01/29/20 12:32  01/29/20 13:49





  Toradol  IVP  01/30/20 06:33  Not Given





  Q6H SHANITA   





     





     





     





     


 


Potassium Chloride  20 meq  01/29/20 12:32  01/29/20 14:25





  Kcl  IVPB   20 meq





  PRN PRN   Administration





  K level </= 4.0   





     





     





     














- Exam


General Appearance: awake alert


Neck: no JVD, no carotid bruit


Heart: RRR, no murmur


Respiratory: rhonchi


Respiratory - other findings: good insp effort. bilat chest tubes


Gastrointestinal: soft, normal bowel sounds


Extremities: no edema





Hosp A/P


(1) Non-ST elevation MI (NSTEMI)


Code(s): I21.4 - NON-ST ELEVATION (NSTEMI) MYOCARDIAL INFARCTION   Status: 

Acute   





(2) CAD (coronary artery disease)


Code(s): I25.10 - ATHSCL HEART DISEASE OF NATIVE CORONARY ARTERY W/O ANG PCTRS 

  Status: Acute   


Qualifiers: 


   Coronary Disease-Associated Artery/Lesion type: native artery   Native vs. 

transplanted heart: native heart   Associated angina: with unstable angina   

Qualified Code(s): I25.110 - Atherosclerotic heart disease of native coronary 

artery with unstable angina pectoris   





(3) DM (diabetes mellitus), type 2, uncontrolled


Code(s): E11.65 - TYPE 2 DIABETES MELLITUS WITH HYPERGLYCEMIA   Status: Chronic

   


Qualifiers: 


   Glycemic state: with hyperglycemia   Qualified Code(s): E11.65 - Type 2 

diabetes mellitus with hyperglycemia   





(4) Tobacco abuse


Code(s): Z72.0 - TOBACCO USE   Status: Chronic   





- Plan


post CABG, doing well


bilat chest tubes


cont [facility] protocol

## 2020-01-30 LAB
ANION GAP SERPL CALC-SCNC: 16 MMOL/L (ref 10–20)
BASOPHILS # BLD AUTO: 0 THOU/UL (ref 0–0.2)
BASOPHILS NFR BLD AUTO: 0.1 % (ref 0–1)
BUN SERPL-MCNC: 9 MG/DL (ref 8.9–20.6)
CALCIUM SERPL-MCNC: 8 MG/DL (ref 7.8–10.44)
CHLORIDE SERPL-SCNC: 109 MMOL/L (ref 98–107)
CO2 SERPL-SCNC: 18 MMOL/L (ref 22–29)
CREAT CL PREDICTED SERPL C-G-VRATE: 102 ML/MIN (ref 70–130)
EOSINOPHIL # BLD AUTO: 0 THOU/UL (ref 0–0.7)
EOSINOPHIL NFR BLD AUTO: 0.1 % (ref 0–10)
GLUCOSE SERPL-MCNC: 109 MG/DL (ref 70–105)
HGB BLD-MCNC: 12.2 G/DL (ref 14–18)
LYMPHOCYTES # BLD: 1.8 THOU/UL (ref 1.2–3.4)
LYMPHOCYTES NFR BLD AUTO: 9.4 % (ref 21–51)
MCH RBC QN AUTO: 32.1 PG (ref 27–31)
MCV RBC AUTO: 98.5 FL (ref 78–98)
MONOCYTES # BLD AUTO: 1.8 THOU/UL (ref 0.11–0.59)
MONOCYTES NFR BLD AUTO: 9.4 % (ref 0–10)
NEUTROPHILS # BLD AUTO: 15.6 THOU/UL (ref 1.4–6.5)
NEUTROPHILS NFR BLD AUTO: 81 % (ref 42–75)
PLATELET # BLD AUTO: 191 THOU/UL (ref 130–400)
POTASSIUM SERPL-SCNC: 4.4 MMOL/L (ref 3.5–5.1)
RBC # BLD AUTO: 3.79 MILL/UL (ref 4.7–6.1)
SODIUM SERPL-SCNC: 139 MMOL/L (ref 136–145)
WBC # BLD AUTO: 19.2 THOU/UL (ref 4.8–10.8)

## 2020-01-30 RX ADMIN — HYDROCODONE BITARTRATE AND ACETAMINOPHEN PRN TAB: 5; 325 TABLET ORAL at 23:08

## 2020-01-30 RX ADMIN — ASPIRIN 81 MG CHEWABLE TABLET SCH MG: 81 TABLET CHEWABLE at 07:35

## 2020-01-30 RX ADMIN — FAMOTIDINE SCH MG: 10 INJECTION, SOLUTION INTRAVENOUS at 07:35

## 2020-01-30 RX ADMIN — INSULIN HUMAN PRN UNITS: 100 INJECTION, SOLUTION PARENTERAL at 20:53

## 2020-01-30 RX ADMIN — INSULIN HUMAN PRN UNITS: 100 INJECTION, SOLUTION PARENTERAL at 15:48

## 2020-01-30 RX ADMIN — HYDROCODONE BITARTRATE AND ACETAMINOPHEN PRN TAB: 5; 325 TABLET ORAL at 14:26

## 2020-01-30 RX ADMIN — HYDROCODONE BITARTRATE AND ACETAMINOPHEN PRN TAB: 5; 325 TABLET ORAL at 03:58

## 2020-01-30 RX ADMIN — Medication SCH ML: at 20:48

## 2020-01-30 NOTE — RAD
Chest AP view



INDICATION: Status post open-heart surgery



COMPARISON: Prior exam dated January 29, 2020



FINDINGS:



Lungs:The lungs are clear



Cardiac silhouette:Midline sternotomy changes and post-CABG changes are stable.



Pulmonary vasculature:Normal



Pleural spaces:No pleural effusion or pneumothorax is demonstrated.



Upper abdomen:No abnormality seen.



Osseous structures: No acute osseous abnormality.



Additional findings:Midline mediastinal drain and left-sided subclavian central venous catheter are s
table.



IMPRESSION: Stable postoperative chest. Stable left subclavian central venous catheter and mediastina
l drain. No pneumothorax.



Reported By: Ronny Meadows 

Electronically Signed:  1/30/2020 8:29 AM

## 2020-01-30 NOTE — PDOC.HOSPP
- Subjective


Encounter Date: 01/30/20


Encounter Time: 09:55


Subjective: 





doing great, incisional pain-mild





- Objective


Vital Signs & Weight: 


 Vital Signs (12 hours)











  Temp Pulse Ox


 


 01/30/20 07:21   97


 


 01/30/20 07:00  98.9 F 


 


 01/30/20 06:00  99 F 


 


 01/30/20 03:21   95


 


 01/30/20 02:00  99.3 F 


 


 01/29/20 22:00  99.7 F H 








 Weight











Weight                         110 lb 3.698 oz











 Most Recent Monitor Data











Heart Rate from ECG            92


 


NIBP                           101/65


 


NIBP BP-Mean                   77


 


Respiration from ECG           20


 


SpO2                           97














I&O: 


 











 01/29/20 01/30/20 01/31/20





 06:59 06:59 06:59


 


Intake Total 4687 1254.3 0


 


Output Total 2850 1200 140


 


Balance 1837 54.3 -140











Result Diagrams: 


 01/30/20 04:00





 01/30/20 04:00


Additional Labs: 


 Accuchecks











  01/30/20 01/30/20 01/30/20





  07:34 06:17 05:10


 


POC Glucose  124 H  126 H  155 H














  01/30/20 01/30/20 01/30/20





  03:02 02:01 01:04


 


POC Glucose  112 H  118 H  102














  01/29/20 01/29/20 01/29/20





  23:59 22:59 21:58


 


POC Glucose  121 H  135 H  117 H














  01/29/20 01/29/20 01/29/20





  20:56 20:04 19:12


 


POC Glucose  111 H  103  101














  01/29/20 01/29/20 01/29/20





  18:21 17:22 16:06


 


POC Glucose  119 H  119 H  134 H














  01/29/20 01/29/20 01/29/20





  14:56 14:09 13:18


 


POC Glucose  115 H  130 H  152 H














  01/29/20 01/29/20 01/29/20





  12:45 12:10 11:52


 


POC Glucose  172 H  220 H  194 H














  01/29/20 01/29/20 01/29/20





  11:31 11:24 11:05


 


POC Glucose  229 H  194 H  98














  01/29/20 01/29/20 01/29/20





  10:48 10:35 10:17


 


POC Glucose  139 H  136 H  141 H














  01/29/20





  10:09


 


POC Glucose  55 L*














Hospitalist ROS





- Medication


Medications: 


Active Medications











Generic Name Dose Route Start Last Admin





  Trade Name Freq  PRN Reason Stop Dose Admin


 


Hydrocodone Bitart/Acetaminophen  1 tab  01/29/20 12:32  01/30/20 03:58





  [place] 5/325  PO   1 tab





  Q4H PRN   Administration





  Moderate Pain (4-6)   





     





     





     


 


Albumin Human  12.5 gm  01/29/20 12:32  01/29/20 23:58





  Albumin 5%  IVPB  01/30/20 12:33  12.5 gm





  Q6H PRN   Administration





  To Maintain SBP> 90 mmHG   





     





     





     


 


Aspirin  81 mg  01/30/20 09:00  01/30/20 07:35





  Aspirin Chewable  PO   81 mg





  DAILY SHANITA   Administration





     





     





     





     


 


Atorvastatin Calcium  20 mg  01/29/20 21:00  01/29/20 20:21





  Lipitor  PO   20 mg





  HS SHANITA   Administration





     





     





     





     


 


Famotidine  20 mg  01/29/20 21:00  01/30/20 07:35





  Pepcid  SLOW IVP   20 mg





  Q12HR SHANITA   Administration





     





     





     





     


 


Fentanyl  50 mcg  01/29/20 12:32  01/29/20 15:32





  Sublimaze  SLOW IVP  01/31/20 07:18  50 mcg





  Q2H PRN   Administration





  Severe Pain (7-10)   





     





     





     


 


Norepinephrine Bitartrate  250 mls @ 0 mls/hr  01/29/20 12:32  01/29/20 18:29





  Levophed  IVPB   250 mls





  PRN PRN   Administration





  To maintain SBP > 90 mmHG   





     





  Protocol   





  Titrate   


 


Sodium Chloride  1,000 mls @ 75 mls/hr  01/29/20 12:32  01/30/20 02:56





  Normal Saline 0.9%  IV   1,000 mls





  .[id number] SHANITA   Administration





     





     





     





     


 


Potassium Chloride  20 meq  01/29/20 12:32  01/29/20 14:25





  Kcl  IVPB   20 meq





  PRN PRN   Administration





  K level </= 4.0   





     





     





     














- Exam


General Appearance: awake alert


Neck: no JVD


Heart: RRR, no murmur


Respiratory: CTAB


Gastrointestinal: soft, normal bowel sounds


Extremities: no edema





Hosp A/P


(1) Non-ST elevation MI (NSTEMI)


Code(s): I21.4 - NON-ST ELEVATION (NSTEMI) MYOCARDIAL INFARCTION   Status: 

Acute   





(2) CAD (coronary artery disease)


Code(s): I25.10 - ATHSCL HEART DISEASE OF NATIVE CORONARY ARTERY W/O ANG PCTRS 

  Status: Acute   


Qualifiers: 


   Coronary Disease-Associated Artery/Lesion type: native artery   Native vs. 

transplanted heart: native heart   Associated angina: with unstable angina   

Qualified Code(s): I25.110 - Atherosclerotic heart disease of native coronary 

artery with unstable angina pectoris   





(3) DM (diabetes mellitus), type 2, uncontrolled


Code(s): E11.65 - TYPE 2 DIABETES MELLITUS WITH HYPERGLYCEMIA   Status: Chronic

   


Qualifiers: 


   Glycemic state: with hyperglycemia   Qualified Code(s): E11.65 - Type 2 

diabetes mellitus with hyperglycemia   





(4) Tobacco abuse


Code(s): Z72.0 - TOBACCO USE   Status: Chronic   





- Plan


post CABG, doing well


bilat chest tubes


cont [facility] protocol


transferring to tele

## 2020-01-30 NOTE — PDOC.CPN
- Subjective


Date: 01/30/20


Time: 08:00


Interval history: 





The pt seen and examined.  No overnight events.  No cardiac complaints.  Off 

Levophed this AM





- Objective


Allergies/Adverse Reactions: 


 Allergies











Allergy/AdvReac Type Severity Reaction Status Date / Time


 


No Known Drug Allergies Allergy   Verified 01/27/20 23:39











Visit Medications: 


 Current Medications





Acetaminophen (Tylenol)  650 mg PO Q6H PRN


   PRN Reason: Headache/Fever Or Mild Pain


Hydrocodone Bitart/Acetaminophen ([place] 5/325)  1 tab PO Q4H PRN


   PRN Reason: Moderate Pain (4-6)


   Last Admin: 01/30/20 03:58 Dose:  1 tab


Hydrocodone Bitart/Acetaminophen ([place] 5/325)  2 tab PO Q4H PRN


   PRN Reason: Severe Pain (7-10)


Al Hydroxide/Mg Hydroxide (Maalox)  30 ml PO Q4H PRN


   PRN Reason: Indigestion


Albumin Human (Albumin 5%)  12.5 gm IVPB Q6H PRN


   PRN Reason: To Maintain SBP> 90 mmHG


   Stop: 01/30/20 12:33


   Last Admin: 01/29/20 23:58 Dose:  12.5 gm


Albumin Human (Albumin 5%)  25 gm IVPB Q6H PRN


   PRN Reason: To Maintain SBP > 90 mmHG


   Stop: 01/30/20 12:33


Albuterol/Ipratropium (Duoneb)  3 ml NEB [id number] PRN


   PRN Reason: SHORTNESS OF BREATH


Aspirin (Aspirin Chewable)  81 mg PO DAILY [facility]


   Last Admin: 01/30/20 07:35 Dose:  81 mg


Atorvastatin Calcium (Lipitor)  20 mg PO HS [facility]


   Last Admin: 01/29/20 20:21 Dose:  20 mg


Bisacodyl (Dulcolax)  10 mg PO Q12H PRN


   PRN Reason: Constipation


Bisacodyl (Dulcolax)  10 mg WI Q12H PRN


   PRN Reason: Constipation


Dextrose/Water (Dextrose 50%)  25 gm SLOW IVP PRN PRN


   PRN Reason: PER HYPOGLYCEMIC PROTOCOL


Famotidine (Pepcid)  20 mg SLOW IVP Q12HR [facility]


   Last Admin: 01/30/20 07:35 Dose:  20 mg


Fentanyl (Sublimaze)  25 mcg SLOW IVP Q2H PRN


   PRN Reason: Moderate Pain (4-6)


   Stop: 01/31/20 07:18


Fentanyl (Sublimaze)  50 mcg SLOW IVP Q2H PRN


   PRN Reason: Severe Pain (7-10)


   Stop: 01/31/20 07:18


   Last Admin: 01/29/20 15:32 Dose:  50 mcg


Glucagon (Glucagon)  1 mg SC PRN PRN


   PRN Reason: PER HYPOGLYCEMIC PROTOCOL


Guaifenesin/Dextromethorphan (Robitussin Dm)  15 ml PO Q4H PRN


   PRN Reason: Cough


Hydralazine HCl (Apresoline)  10 mg SLOW IVP Q6H PRN


   PRN Reason: To Maintain SBP< 140mmHG


Norepinephrine Bitartrate (Levophed)  250 mls @ 0 mls/hr IVPB PRN PRN; Protocol


   PRN Reason: To maintain SBP > 90 mmHG


   Last Admin: 01/29/20 18:29 Dose:  250 mls


Nicardipine HCl 25 mg/ Sodium (Chloride)  260 mls @ 0 mls/hr IVPB INF PRN; 

Protocol


   PRN Reason: To Maintain SBP< 140mmHG


Nitroglycerin/Dextrose (Nitroglycerin 50 Mg/250 Ml Bot)  250 mls @ 0 mls/hr 

IVPB PRN PRN; Protocol


   PRN Reason: To Maintain SBP< 140mmHG


Sodium Chloride (Normal Saline 0.9%)  1,000 mls @ 75 mls/hr IV .[id number] SHANITA


   Last Admin: 01/30/20 02:56 Dose:  1,000 mls


Insulin Human Regular 100 (units/ Sodium Chloride)  101 mls @ 0 mls/hr IVPB INF 

SHANITA; Protocol


Dextrose/Water (D5w)  1,000 mls @ 0 mls/hr IV INF PRN


   PRN Reason: PRN HYPOGLYCEMIC PROTOCOL


Insulin Human Regular (Humulin R)  0 units SC Q4H PRN; Protocol


   PRN Reason: POST OP SLIDING SCALE


Morphine Sulfate (Morphine)  2 mg SLOW IVP Q15MIN PRN


   PRN Reason: Severe Pain (7-10)


Ondansetron HCl (Zofran)  4 mg IVP Q6H PRN


   PRN Reason: Nausea/Vomiting


Potassium Chloride (Kcl)  20 meq IVPB PRN PRN


   PRN Reason: K level </= 4.0


   Last Admin: 01/29/20 14:25 Dose:  20 meq


Promethazine HCl (Phenergan)  6.25 mg IM Q4H PRN


   PRN Reason: Nausea/Vomiting








Vital Signs & Weight: 


 Vital Signs











  Temp Pulse Ox


 


 01/30/20 07:21   97


 


 01/30/20 07:00  98.9 F 


 


 01/30/20 06:00  99 F 


 


 01/30/20 03:21   95


 


 01/30/20 02:00  99.3 F 


 


 01/29/20 22:00  99.7 F H 








 











Weight                         110 lb 3.698 oz

















- Physical Exam


General: alert & oriented x3


HEENT: mucus membranes moist


Neck: supple neck


Cardiac: regular rate and rhythm, S1/S2


Lungs: clear to auscultation, decreased breath sounds


Neuro: cranial nerve 2-12 intact


Extremities: no edema





- Labs


Result Diagrams: 


 01/30/20 04:00





 01/30/20 04:00


 Troponin/CKMB











CK-MB (CK-2)  1.0 ng/mL (0-6.6)   01/28/20  04:19    


 


Troponin I  0.094 ng/mL (< 0.028)  H  01/28/20  04:19    














- Telemetry


Sinus rhythms and dysrhythmias: sinus rhythm





- Assessment/Plan


Assessment/Plan: 





1. CAD with S/p CABG x4 on 01/28/2020 with GARCIA-LAD, RSVG-PDA, Lt Radial-Ramus 

adn OM - stable; will start bblocker and ACE/ARB with more stable VS


2. DM type 2 - on CABG protocol Insulin drip


3. Tobacco and marijuana abuse - tobacco and illicit drug cessation education 

given to the pt  


MAR reviewed





* Echo on 01/28/2020 with EF 50-55%, trace MR and mild TR





Pt. seen and eval. by me. I agree with the A/P by the NP. Chest clear. RRR. 

Chest tubes still in. doing well post CABG. gjm

## 2020-01-31 LAB
ANION GAP SERPL CALC-SCNC: 11 MMOL/L (ref 10–20)
BASOPHILS # BLD AUTO: 0.1 THOU/UL (ref 0–0.2)
BASOPHILS NFR BLD AUTO: 0.4 % (ref 0–1)
BUN SERPL-MCNC: 9 MG/DL (ref 8.9–20.6)
CALCIUM SERPL-MCNC: 7.8 MG/DL (ref 7.8–10.44)
CHLORIDE SERPL-SCNC: 105 MMOL/L (ref 98–107)
CO2 SERPL-SCNC: 23 MMOL/L (ref 22–29)
CREAT CL PREDICTED SERPL C-G-VRATE: 116 ML/MIN (ref 70–130)
EOSINOPHIL # BLD AUTO: 0.1 THOU/UL (ref 0–0.7)
EOSINOPHIL NFR BLD AUTO: 0.5 % (ref 0–10)
GLUCOSE SERPL-MCNC: 155 MG/DL (ref 70–105)
HGB BLD-MCNC: 11.8 G/DL (ref 14–18)
LYMPHOCYTES # BLD: 3.4 THOU/UL (ref 1.2–3.4)
LYMPHOCYTES NFR BLD AUTO: 29.2 % (ref 21–51)
MCH RBC QN AUTO: 32.3 PG (ref 27–31)
MCV RBC AUTO: 97.7 FL (ref 78–98)
MONOCYTES # BLD AUTO: 1.3 THOU/UL (ref 0.11–0.59)
MONOCYTES NFR BLD AUTO: 11.3 % (ref 0–10)
NEUTROPHILS # BLD AUTO: 6.8 THOU/UL (ref 1.4–6.5)
NEUTROPHILS NFR BLD AUTO: 58.6 % (ref 42–75)
PLATELET # BLD AUTO: 177 THOU/UL (ref 130–400)
POTASSIUM SERPL-SCNC: 3.7 MMOL/L (ref 3.5–5.1)
RBC # BLD AUTO: 3.64 MILL/UL (ref 4.7–6.1)
SODIUM SERPL-SCNC: 135 MMOL/L (ref 136–145)
WBC # BLD AUTO: 11.7 THOU/UL (ref 4.8–10.8)

## 2020-01-31 RX ADMIN — INSULIN HUMAN PRN UNITS: 100 INJECTION, SOLUTION PARENTERAL at 11:28

## 2020-01-31 RX ADMIN — ASPIRIN 81 MG CHEWABLE TABLET SCH MG: 81 TABLET CHEWABLE at 07:55

## 2020-01-31 RX ADMIN — Medication SCH ML: at 07:59

## 2020-01-31 RX ADMIN — Medication SCH ML: at 20:44

## 2020-01-31 RX ADMIN — INSULIN HUMAN PRN UNITS: 100 INJECTION, SOLUTION PARENTERAL at 17:14

## 2020-01-31 NOTE — PDOC.CPN
- Subjective


Date: 01/31/20


Time: 08:30


Interval history: 





The pt seen and examined.  No overnight events.  No cardiac complaints.  The pt 

is up to chair without any difficulties. 





- Objective


Allergies/Adverse Reactions: 


 Allergies











Allergy/AdvReac Type Severity Reaction Status Date / Time


 


No Known Drug Allergies Allergy   Verified 01/27/20 23:39











Visit Medications: 


 Current Medications





Hydrocodone Bitart/Acetaminophen ([place] 5/325)  1 tab PO Q4H PRN


   PRN Reason: Moderate Pain (4-6)


   Last Admin: 01/30/20 23:08 Dose:  1 tab


Hydrocodone Bitart/Acetaminophen ([place] 5/325)  2 tab PO Q4H PRN


   PRN Reason: Severe Pain (7-10)


Al Hydroxide/Mg Hydroxide (Maalox)  30 ml PO Q4H PRN


   PRN Reason: Indigestion


Albuterol/Ipratropium (Duoneb)  3 ml NEB [id number] PRN


   PRN Reason: SHORTNESS OF BREATH


Artificial Tears (Tears Naturale)  0 drop EA EYE PRN PRN


   PRN Reason: Dry Eyes


Aspirin (Aspirin Chewable)  81 mg PO DAILY [facility]


   Last Admin: 01/31/20 07:55 Dose:  81 mg


Atorvastatin Calcium (Lipitor)  20 mg PO HS [facility]


   Last Admin: 01/30/20 20:46 Dose:  20 mg


Bisacodyl (Dulcolax)  10 mg PO Q12H PRN


   PRN Reason: Constipation


Bisacodyl (Dulcolax)  10 mg MA Q12H PRN


   PRN Reason: Constipation


Diphenhydramine HCl (Benadryl)  25 mg PO Q6H PRN


   PRN Reason: Itching & Insomnia or Rajendra Kee


Glucagon (Glucagon)  1 mg IM PRN PRN


   PRN Reason: HYPOGLYCEMIA PROTOCOL


Guaifenesin/Dextromethorphan (Robitussin Dm)  15 ml PO Q4H PRN


   PRN Reason: Cough


Insulin Human Regular (Humulin R)  0 units SC .MILD SLIDING PRN; Protocol


   PRN Reason: MILD SLIDING SCALE


   Last Admin: 01/30/20 20:53 Dose:  4 units


Ketorolac Tromethamine (Toradol)  30 mg IVP Q6HR [facility]


   Stop: 02/01/20 23:59


   Last Admin: 01/31/20 06:29 Dose:  30 mg


Metoprolol Tartrate (Lopressor)  25 mg PO BID [facility]


   Last Admin: 01/31/20 07:55 Dose:  25 mg


Mineral Oil (Fleet Mineral Oil)  133 ml MA DAILYPRN PRN


   PRN Reason: Constipation


Nitroglycerin (Nitrostat)  0.4 mg SL Q5MIN PRN


   PRN Reason: Chest Pain


Ondansetron HCl (Zofran)  4 mg IVP Q6H PRN


   PRN Reason: Nausea/Vomiting


Sodium Chloride (Flush - Normal Saline)  10 ml IVF Q12HR [facility]


   Last Admin: 01/31/20 07:59 Dose:  10 ml


Zolpidem Tartrate (Ambien)  5 mg PO HSPRN PRN


   PRN Reason: Insomnia








Vital Signs & Weight: 


 Vital Signs











  Temp Pulse Ox


 


 01/31/20 07:28   96


 


 01/31/20 07:00  98.6 F 


 


 01/31/20 04:00  97.8 F 


 


 01/31/20 00:00  98.7 F 


 


 01/30/20 21:00  98.8 F 








 











Weight                         111 lb 12.39 oz

















- Physical Exam


General: alert & oriented x3


HEENT: mucus membranes moist


Neck: supple neck


Cardiac: regular rate and rhythm, S1/S2


Lungs: clear to auscultation


Neuro: cranial nerve 2-12 intact


Extremities: no edema





- Labs


Result Diagrams: 


 01/31/20 04:20





 01/31/20 04:20


 Troponin/CKMB











CK-MB (CK-2)  1.0 ng/mL (0-6.6)   01/28/20  04:19    


 


Troponin I  0.094 ng/mL (< 0.028)  H  01/28/20  04:19    














- Telemetry


Sinus rhythms and dysrhythmias: sinus rhythm





- Assessment/Plan


Assessment/Plan: 





1. CAD with S/p CABG x4 on 01/28/2020 with GARCIA-LAD, RSVG-PDA, Lt Radial-Ramus 

adn OM - stable; Metoprolol was started from yesterday and increased to 25mg 

BID from this AM; on ASA and Statin


2. DM type 2 - 


3. Tobacco and marijuana abuse - tobacco and illicit drug cessation education 

given to the pt  


MAR reviewed





* Echo on 01/28/2020 with EF 50-55%, trace MR and mild TR





Pt. seen and eval. by me. I agree with the A/P by rthe NP. He has no cardiac 

complaints. chest clear. RRR. No edema. prob. home in a couple days. Will need 

assistance with meds.

## 2020-01-31 NOTE — PDOC.HOSPP
- Subjective


Encounter Date: 01/31/20


Encounter Time: 12:30


Subjective: 





Patient seen and examined for NSTEMI. No CP. No new complaints. No overnight 

events





- Objective


Vital Signs & Weight: 


 Vital Signs (12 hours)











  Temp Pulse Pulse Pulse Resp BP BP


 


 01/31/20 18:31  98.1 F  90    17  


 


 01/31/20 15:00  98.5 F      


 


 01/31/20 13:43    77  87   111/71  116/77


 


 01/31/20 13:00  98.3 F      


 


 01/31/20 09:39    86  87   105/73  108/76














  BP Pulse Ox Pulse Ox Pulse Ox


 


 01/31/20 18:31  131/77  97  


 


 01/31/20 15:00    


 


 01/31/20 13:43    99  98


 


 01/31/20 13:00    


 


 01/31/20 09:39    97  97








 Weight











Weight                         111 lb 12.39 oz











 Most Recent Monitor Data











Heart Rate from ECG            83


 


NIBP                           126/74


 


NIBP BP-Mean                   91


 


Respiration from ECG           15


 


SpO2                           97














I&O: 


 











 01/30/20 01/31/20 02/01/20





 06:59 06:59 06:59


 


Intake Total 1254.3 3490 1080


 


Output Total 1200 1760 1720


 


Balance 54.3 1730 -640











Result Diagrams: 


 01/31/20 04:20





 01/31/20 04:20


Additional Labs: 


 Accuchecks











  01/31/20 01/31/20 01/31/20





  17:11 11:28 06:32


 


POC Glucose  166 H  190 H  144 H














  01/30/20





  20:56


 


POC Glucose  261 H











EKG Reviewed by me: Yes (Tele SR)





Hospitalist ROS





- Review of Systems


Respiratory: denies: cough, dry, shortness of breath, hemoptysis, SOB with 

excertion, pleuritic pain, sputum, wheezing, other


Cardiovascular: denies: chest pain, palpitations, orthopnea, paroxysmal noc. 

dyspnea, edema, light headedness, other





- Medication


Medications: 


Active Medications











Generic Name Dose Route Start Last Admin





  Trade Name Freq  PRN Reason Stop Dose Admin


 


Hydrocodone Bitart/Acetaminophen  1 tab  01/29/20 12:32  01/30/20 23:08





  [place] 5/325  PO   1 tab





  Q4H PRN   Administration





  Moderate Pain (4-6)   





     





     





     


 


Aspirin  81 mg  01/30/20 09:00  01/31/20 07:55





  Aspirin Chewable  PO   81 mg





  DAILY SHANITA   Administration





     





     





     





     


 


Atorvastatin Calcium  20 mg  01/29/20 21:00  01/30/20 20:46





  Lipitor  PO   20 mg





  HS SHANITA   Administration





     





     





     





     


 


Insulin Human Regular  0 units  01/30/20 09:47  01/31/20 17:14





  Humulin R  SC   2 units





  .MILD SLIDING PRN   Administration





  MILD SLIDING SCALE   





     





  Protocol   





     


 


Ketorolac Tromethamine  30 mg  01/30/20 23:59  01/31/20 17:14





  Toradol  IVP  02/01/20 23:59  30 mg





  Q6HR SHANITA   Administration





     





     





     





     


 


Metoprolol Tartrate  25 mg  01/31/20 09:00  01/31/20 07:55





  Lopressor  PO   25 mg





  BID SHANITA   Administration





     





     





     





     


 


Sodium Chloride  10 ml  01/30/20 21:00  01/31/20 07:59





  Flush - Normal Saline  IVF   10 ml





  Q12HR SHANITA   Administration





     





     





     





     














- Exam


General Appearance: NAD


Heart: RRR, no gallops


Respiratory: no wheezes, no rales, no ronchi


Gastrointestinal: non-tender, non-distended, normal bowel sounds


Extremities: no clubbing





Hosp A/P





- Plan


DVT proph w/SCDs





CP/NSTEMI


CAD s/p CABG


DM2


Tobacco dep


Cannabis abuse


F/H of premature CAD


Hyponatremia





PLAN:


Cont supportive care 


Cont ASA/Statin


Cont Metoprolol


Cont other meds as above

## 2020-01-31 NOTE — PDOC.GSPN
Surgery Progress Note: Subj





- Subjective


Patient reports: no new complaints, feels better, positive flatus, pain well 

controlled


Narrative: 


 Patient reports mild pain at incision site, doing well. 








Surgery Progress Note: Obj





- Vital signs


Vital signs: 


 Vital Signs - Most Recent











Temp Pulse Resp BP Pulse Ox


 


 97.8 F   82   16   131/74   97 


 


 01/31/20 04:00  01/29/20 05:04  01/29/20 05:04  01/29/20 05:04  01/30/20 19:20














- Physical Exam


General: no distress


Cardiovascular: regular rate and rhythm, no murmur


Respiratory: clear to auscultation, breath sounds present





Surgery Progress Note: Results





- Labs


Result Diagrams: 


 01/31/20 04:20





 01/31/20 04:20


Lab results: 


 Laboratory Results - last 24 hr











  01/30/20 01/31/20 01/31/20





  20:56 04:20 04:20


 


WBC    11.7 H


 


RBC    3.64 L


 


Hgb    11.8 L


 


Hct    35.5 L


 


MCV    97.7


 


MCH    32.3 H


 


MCHC    33.1


 


RDW    11.8


 


Plt Count    177


 


MPV    7.4


 


Neutrophils %    58.6


 


Lymphocytes %    29.2


 


Monocytes %    11.3 H


 


Eosinophils %    0.5


 


Basophils %    0.4


 


Neutrophils #    6.8 H


 


Lymphocytes #    3.4


 


Monocytes #    1.3 H


 


Eosinophils #    0.1


 


Basophils #    0.1


 


Sodium   135 L 


 


Potassium   3.7 


 


Chloride   105 


 


Carbon Dioxide   23 


 


Anion Gap   11 


 


BUN   9 


 


Creatinine   0.62 L 


 


Estimated GFR (MDRD)   Greater than  90 


 


Glucose   155 H 


 


POC Glucose  261 H  


 


Calcium   7.8 














  01/31/20





  06:32


 


WBC 


 


RBC 


 


Hgb 


 


Hct 


 


MCV 


 


MCH 


 


MCHC 


 


RDW 


 


Plt Count 


 


MPV 


 


Neutrophils % 


 


Lymphocytes % 


 


Monocytes % 


 


Eosinophils % 


 


Basophils % 


 


Neutrophils # 


 


Lymphocytes # 


 


Monocytes # 


 


Eosinophils # 


 


Basophils # 


 


Sodium 


 


Potassium 


 


Chloride 


 


Carbon Dioxide 


 


Anion Gap 


 


BUN 


 


Creatinine 


 


Estimated GFR (MDRD) 


 


Glucose 


 


POC Glucose  144 H


 


Calcium 














Surgery Progress Note: A/P





- Problem


(1) CAD (coronary artery disease)


Current Visit: Yes   Code(s): I25.10 - ATHSCL HEART DISEASE OF NATIVE CORONARY 

ARTERY W/O ANG PCTRS   Status: Acute   


Qualifiers: 


   Coronary Disease-Associated Artery/Lesion type: native artery   Native vs. 

transplanted heart: native heart   Associated angina: with unstable angina   

Qualified Code(s): I25.110 - Atherosclerotic heart disease of native coronary 

artery with unstable angina pectoris   


Assessment and Plan: 


Pain appears to be improving and stable. Continue current medications and 

monitoring.

## 2020-01-31 NOTE — RAD
Portable frontal chest radiograph:

1/31/2020



COMPARISON: 1/30/2020



HISTORY: Evaluate chest following CABG



FINDINGS: Midline sternotomy wires and mediastinal clips are stable. Stable left-sided vascular angelic
ter. Stable drainage catheters overlie the midline lower mediastinum. No focal consolidation or

alveolar edema. Supine imaging provided, limiting assessment for pneumothorax and pleural fluid.



IMPRESSION: Postoperative changes as above.



Reported By: Archie Castelan 

Electronically Signed:  1/31/2020 7:51 AM

## 2020-02-01 LAB
ANION GAP SERPL CALC-SCNC: 12 MMOL/L (ref 10–20)
BASOPHILS # BLD AUTO: 0.1 THOU/UL (ref 0–0.2)
BASOPHILS NFR BLD AUTO: 0.6 % (ref 0–1)
BUN SERPL-MCNC: 11 MG/DL (ref 8.9–20.6)
CALCIUM SERPL-MCNC: 8 MG/DL (ref 7.8–10.44)
CHLORIDE SERPL-SCNC: 102 MMOL/L (ref 98–107)
CO2 SERPL-SCNC: 26 MMOL/L (ref 22–29)
CREAT CL PREDICTED SERPL C-G-VRATE: 109 ML/MIN (ref 70–130)
EOSINOPHIL # BLD AUTO: 0.2 THOU/UL (ref 0–0.7)
EOSINOPHIL NFR BLD AUTO: 1.7 % (ref 0–10)
GLUCOSE SERPL-MCNC: 155 MG/DL (ref 70–105)
HGB BLD-MCNC: 11 G/DL (ref 14–18)
LYMPHOCYTES # BLD: 2.8 THOU/UL (ref 1.2–3.4)
LYMPHOCYTES NFR BLD AUTO: 28.3 % (ref 21–51)
MCH RBC QN AUTO: 32.5 PG (ref 27–31)
MCV RBC AUTO: 96.7 FL (ref 78–98)
MONOCYTES # BLD AUTO: 0.9 THOU/UL (ref 0.11–0.59)
MONOCYTES NFR BLD AUTO: 9.3 % (ref 0–10)
NEUTROPHILS # BLD AUTO: 6 THOU/UL (ref 1.4–6.5)
NEUTROPHILS NFR BLD AUTO: 60.1 % (ref 42–75)
PLATELET # BLD AUTO: 190 THOU/UL (ref 130–400)
POTASSIUM SERPL-SCNC: 4 MMOL/L (ref 3.5–5.1)
RBC # BLD AUTO: 3.37 MILL/UL (ref 4.7–6.1)
SODIUM SERPL-SCNC: 136 MMOL/L (ref 136–145)
WBC # BLD AUTO: 10 THOU/UL (ref 4.8–10.8)

## 2020-02-01 RX ADMIN — ASPIRIN 81 MG CHEWABLE TABLET SCH MG: 81 TABLET CHEWABLE at 09:26

## 2020-02-01 RX ADMIN — Medication SCH ML: at 21:05

## 2020-02-01 RX ADMIN — Medication SCH ML: at 09:27

## 2020-02-01 RX ADMIN — INSULIN HUMAN PRN UNITS: 100 INJECTION, SOLUTION PARENTERAL at 21:04

## 2020-02-02 VITALS — DIASTOLIC BLOOD PRESSURE: 70 MMHG | SYSTOLIC BLOOD PRESSURE: 109 MMHG

## 2020-02-02 VITALS — TEMPERATURE: 98.5 F

## 2020-02-02 LAB
ANION GAP SERPL CALC-SCNC: 12 MMOL/L (ref 10–20)
BASOPHILS # BLD AUTO: 0.1 THOU/UL (ref 0–0.2)
BASOPHILS NFR BLD AUTO: 0.7 % (ref 0–1)
BUN SERPL-MCNC: 14 MG/DL (ref 8.9–20.6)
CALCIUM SERPL-MCNC: 8.1 MG/DL (ref 7.8–10.44)
CHLORIDE SERPL-SCNC: 106 MMOL/L (ref 98–107)
CO2 SERPL-SCNC: 26 MMOL/L (ref 22–29)
CREAT CL PREDICTED SERPL C-G-VRATE: 109 ML/MIN (ref 70–130)
EOSINOPHIL # BLD AUTO: 0.3 THOU/UL (ref 0–0.7)
EOSINOPHIL NFR BLD AUTO: 3.5 % (ref 0–10)
GLUCOSE SERPL-MCNC: 131 MG/DL (ref 70–105)
HGB BLD-MCNC: 10.7 G/DL (ref 14–18)
LYMPHOCYTES # BLD: 3.5 THOU/UL (ref 1.2–3.4)
LYMPHOCYTES NFR BLD AUTO: 41.6 % (ref 21–51)
MCH RBC QN AUTO: 32.1 PG (ref 27–31)
MCV RBC AUTO: 96.7 FL (ref 78–98)
MONOCYTES # BLD AUTO: 0.8 THOU/UL (ref 0.11–0.59)
MONOCYTES NFR BLD AUTO: 9.5 % (ref 0–10)
NEUTROPHILS # BLD AUTO: 3.7 THOU/UL (ref 1.4–6.5)
NEUTROPHILS NFR BLD AUTO: 44.8 % (ref 42–75)
PLATELET # BLD AUTO: 223 THOU/UL (ref 130–400)
POTASSIUM SERPL-SCNC: 3.5 MMOL/L (ref 3.5–5.1)
RBC # BLD AUTO: 3.33 MILL/UL (ref 4.7–6.1)
SODIUM SERPL-SCNC: 140 MMOL/L (ref 136–145)
WBC # BLD AUTO: 8.3 THOU/UL (ref 4.8–10.8)

## 2020-02-02 RX ADMIN — Medication SCH ML: at 09:07

## 2020-02-02 RX ADMIN — INSULIN HUMAN PRN UNITS: 100 INJECTION, SOLUTION PARENTERAL at 11:43

## 2020-02-02 RX ADMIN — ASPIRIN 81 MG CHEWABLE TABLET SCH MG: 81 TABLET CHEWABLE at 09:07

## 2020-02-02 NOTE — DIS
DATE OF ADMISSION:  01/27/2020



DATE OF DISCHARGE:  02/02/2020



PRIMARY CARE PROVIDER:  [facility].



DISCHARGE DIAGNOSES:  

1. Non-ST elevation myocardial infarction.

2. Coronary artery bypass graft during this hospitalization.

3. Poorly controlled diabetes mellitus.

4. Hyponatremia.

5. Tobacco abuse.

6. Marijuana use.



CONDITION:  Condition of the patient on the day of discharge:  Stable. 



I assessed Mr. Vidal on the day of discharge.  He denies any chest pain or shortness

of breath.  Vital signs are stable.  S1 and S2 are heard, regular.  Lungs are clear

to auscultation bilaterally. 



CONSULTATIONS DURING THIS HOSPITALIZATION:  

1. Cardiology, Dr. Tee.

2. Cardiovascular Surgery, Dr. Madera.



POST-ACUTE CARE FOLLOWUP:  With primary care provider on February 5, 2020 at 1:45

p.m., with Dr. Tee in 3 to 4 weeks, and with Dr. Madera in 2 weeks and with

cardiac rehab. 



DIET:  Diabetic and heart healthy.



ACTIVITY:  As tolerated, with sternal precautions.



DISCHARGE MEDICATIONS:  

1. Aspirin 81 mg daily.

2. Lipitor 20 mg at bedtime.

3. Metformin extended release 1000 mg daily.

4. Lopressor 25 mg 2 times a day.



HOSPITAL COURSE:  Mr. Vidal is a pleasant 34-year-old gentleman, who was admitted to

[facility] on January 27, 2020, for chest pain and elevated

troponin.  Please refer to Ms. Shukla's history and physical note dated January 28, 2020, for further details.  He was seen by Cardiology Service.  He underwent cardiac

catheterization and was found to have severe three-vessel disease.  He was

recommended urgent coronary artery bypass graft.  He was seen by Cardiovascular

Surgery.  On January 29th, he underwent coronary artery bypass graft x4.  He

continued to improve during the postoperative period and is being discharged home in

a stable condition. 



His hemoglobin A1c was 10 during this hospitalization.  He has been started on

extended-release metformin and was advised that he will need further adjustments to

his diabetes medications done through his primary care provider. 



During this hospitalization, he had triglycerides of 134, cholesterol 172, LDL

cholesterol of 98, and HDL cholesterol of 47. 



Many thanks for allowing me to participate in your patient's care.  Please feel free

to contact me with any questions or concerns. 



DISCHARGE DESTINATION:  Home.



TIME SPENT:  Total amount of time spent in coordinating this discharge:  33 minutes.







Job ID:  [id number]

## 2020-02-03 NOTE — EKG
Test Reason : POST CABG

Blood Pressure : ***/*** mmHG

Vent. Rate : 079 BPM     Atrial Rate : 079 BPM

   P-R Int : 158 ms          QRS Dur : 084 ms

    QT Int : 410 ms       P-R-T Axes : 079 090 096 degrees

   QTc Int : 470 ms

 

Normal sinus rhythm

Rightward axis



Prolonged QT

Abnormal ECG

When compared with ECG of 27-JAN-2020 18:23, (Unconfirmed)

QRS axis Shifted right

T wave inversion no longer evident in Inferior leads

T wave inversion more evident in Anterior leads

QT has lengthened

Confirmed by DEXTER MATTSON ([id number]) on 2/3/2020 12:01:44 AM

 

Referred By:  KYLE           Confirmed By:DEXTER MATTSON

## 2022-10-04 NOTE — PDOC.HOSPP
- Subjective


Encounter Date: 02/01/20


Encounter Time: 08:20


Subjective: 





Pt seen for followup re; NSTEMI.  No complaints today.





- Objective


Vital Signs & Weight: 


 Vital Signs (12 hours)











  Temp Pulse Pulse Pulse Resp BP BP


 


 02/01/20 16:48  98.4 F  89    18  


 


 02/01/20 11:58  98.3 F  78    20  


 


 02/01/20 08:20    86  82   122/77  124/74


 


 02/01/20 07:36  98.6 F  77    17  














  BP Pulse Ox


 


 02/01/20 16:48  126/81  99


 


 02/01/20 11:58  110/70  95


 


 02/01/20 08:20  


 


 02/01/20 07:36  120/83  96








 Weight











Weight                         110 lb 14.4 oz











 Most Recent Monitor Data











Heart Rate from ECG            83


 


NIBP                           126/74


 


NIBP BP-Mean                   91


 


Respiration from ECG           15


 


SpO2                           97














I&O: 


 











 01/31/20 02/01/20 02/02/20





 06:59 06:59 06:59


 


Intake Total 3490 1080 


 


Output Total 1760 1720 


 


Balance 1730 -640 











Result Diagrams: 


 02/01/20 03:44





 02/01/20 03:44


Additional Labs: 


 Accuchecks











  02/01/20 02/01/20 01/31/20





  10:56 05:56 20:34


 


POC Glucose  218 H  160 H  186 H














  01/31/20





  17:11


 


POC Glucose  166 H








labs and MARs reviewed by me





Hospitalist ROS





- Review of Systems


Cardiovascular: denies: chest pain, palpitations, orthopnea, paroxysmal noc. 

dyspnea, edema, light headedness


Gastrointestinal: denies: nausea, vomiting, abdominal pain, diarrhea, 

constipation, melena, hematochezia





- Medication


Medications: 


Active Medications











Generic Name Dose Route Start Last Admin





  Trade Name Freq  PRN Reason Stop Dose Admin


 


Hydrocodone Bitart/Acetaminophen  1 tab  01/29/20 12:32  01/30/20 23:08





  [place] 5/325  PO   1 tab





  Q4H PRN   Administration





  Moderate Pain (4-6)   





     





     





     


 


Aspirin  81 mg  01/30/20 09:00  02/01/20 09:26





  Aspirin Chewable  PO   81 mg





  DAILY SHANITA   Administration





     





     





     





     


 


Atorvastatin Calcium  20 mg  01/29/20 21:00  01/31/20 20:44





  Lipitor  PO   20 mg





  HS SHANITA   Administration





     





     





     





     


 


Insulin Human Regular  0 units  01/30/20 09:47  01/31/20 17:14





  Humulin R  SC   2 units





  .MILD SLIDING PRN   Administration





  MILD SLIDING SCALE   





     





  Protocol   





     


 


Ketorolac Tromethamine  30 mg  01/30/20 23:59  02/01/20 11:50





  Toradol  IVP  02/01/20 23:59  30 mg





  Q6HR SHANITA   Administration





     





     





     





     


 


Metoprolol Tartrate  25 mg  01/31/20 09:00  02/01/20 09:26





  Lopressor  PO   25 mg





  BID SHANITA   Administration





     





     





     





     


 


Sodium Chloride  10 ml  01/30/20 21:00  02/01/20 09:27





  Flush - Normal Saline  IVF   10 ml





  Q12HR SHANITA   Administration





     





     





     





     














- Exam


General Appearance: NAD


Eye: anicteric sclera


ENT: moist mucosa


Neck: supple


Heart: RRR


Respiratory: CTAB


Gastrointestinal: soft, non-tender


Extremities: no cyanosis


Skin: no rashes


Musculoskeletal: no muscle wasting


Psychiatric: normal affect





Hosp A/P





- Plan





- Assessment


NSTEMI


DM2


CAD


Tobacco dep


Cannabis abuse


F/H of premature CAD








- Plan


s/p CABG


Cont aspirin and statin


Continue  Metoprolol


Cardiac rehab


Hyponatremia resolved Lab orders pended.  Not sure on diagnosis.  Please enter.